# Patient Record
Sex: MALE | Race: BLACK OR AFRICAN AMERICAN | Employment: OTHER | URBAN - METROPOLITAN AREA
[De-identification: names, ages, dates, MRNs, and addresses within clinical notes are randomized per-mention and may not be internally consistent; named-entity substitution may affect disease eponyms.]

---

## 2021-04-21 ENCOUNTER — OFFICE VISIT (OUTPATIENT)
Dept: PODIATRY | Age: 66
End: 2021-04-21
Payer: MEDICARE

## 2021-04-21 VITALS
OXYGEN SATURATION: 98 % | HEIGHT: 68 IN | SYSTOLIC BLOOD PRESSURE: 140 MMHG | DIASTOLIC BLOOD PRESSURE: 73 MMHG | BODY MASS INDEX: 47.74 KG/M2 | TEMPERATURE: 98 F | HEART RATE: 86 BPM | WEIGHT: 315 LBS

## 2021-04-21 DIAGNOSIS — E11.42 TYPE 2 DIABETES MELLITUS WITH DIABETIC POLYNEUROPATHY, WITHOUT LONG-TERM CURRENT USE OF INSULIN (HCC): ICD-10-CM

## 2021-04-21 DIAGNOSIS — S90.221A SUBUNGUAL HEMATOMA OF FOOT, RIGHT, INITIAL ENCOUNTER: Primary | ICD-10-CM

## 2021-04-21 PROCEDURE — 3017F COLORECTAL CA SCREEN DOC REV: CPT | Performed by: PODIATRIST

## 2021-04-21 PROCEDURE — 1101F PT FALLS ASSESS-DOCD LE1/YR: CPT | Performed by: PODIATRIST

## 2021-04-21 PROCEDURE — G8536 NO DOC ELDER MAL SCRN: HCPCS | Performed by: PODIATRIST

## 2021-04-21 PROCEDURE — 99203 OFFICE O/P NEW LOW 30 MIN: CPT | Performed by: PODIATRIST

## 2021-04-21 PROCEDURE — G8427 DOCREV CUR MEDS BY ELIG CLIN: HCPCS | Performed by: PODIATRIST

## 2021-04-21 PROCEDURE — G8510 SCR DEP NEG, NO PLAN REQD: HCPCS | Performed by: PODIATRIST

## 2021-04-21 PROCEDURE — 3046F HEMOGLOBIN A1C LEVEL >9.0%: CPT | Performed by: PODIATRIST

## 2021-04-21 PROCEDURE — 11730 AVULSION NAIL PLATE SIMPLE 1: CPT | Performed by: PODIATRIST

## 2021-04-21 PROCEDURE — 2022F DILAT RTA XM EVC RTNOPTHY: CPT | Performed by: PODIATRIST

## 2021-04-21 PROCEDURE — G8417 CALC BMI ABV UP PARAM F/U: HCPCS | Performed by: PODIATRIST

## 2021-04-21 RX ORDER — SPIRONOLACTONE 25 MG/1
25 TABLET ORAL DAILY
COMMUNITY

## 2021-04-21 RX ORDER — GABAPENTIN 300 MG/1
300 CAPSULE ORAL 3 TIMES DAILY
COMMUNITY

## 2021-04-21 RX ORDER — ISOSORBIDE MONONITRATE 30 MG/1
30 TABLET, EXTENDED RELEASE ORAL DAILY
COMMUNITY

## 2021-04-21 RX ORDER — ATORVASTATIN CALCIUM 10 MG/1
10 TABLET, FILM COATED ORAL DAILY
COMMUNITY

## 2021-04-21 RX ORDER — CHLORTHALIDONE 25 MG/1
25 TABLET ORAL DAILY
COMMUNITY

## 2021-04-21 RX ORDER — METFORMIN HYDROCHLORIDE 500 MG/1
500 TABLET ORAL 2 TIMES DAILY WITH MEALS
COMMUNITY

## 2021-04-21 RX ORDER — HYDRALAZINE HYDROCHLORIDE 50 MG/1
50 TABLET, FILM COATED ORAL 3 TIMES DAILY
COMMUNITY

## 2021-04-21 RX ORDER — FUROSEMIDE 20 MG/1
20 TABLET ORAL DAILY
COMMUNITY

## 2021-04-21 RX ORDER — PHENOL/SODIUM PHENOLATE
20 AEROSOL, SPRAY (ML) MUCOUS MEMBRANE DAILY
COMMUNITY

## 2021-04-21 RX ORDER — METOPROLOL TARTRATE 50 MG/1
50 TABLET ORAL 2 TIMES DAILY
COMMUNITY

## 2021-04-21 RX ORDER — LISINOPRIL 20 MG/1
20 TABLET ORAL DAILY
COMMUNITY

## 2021-04-21 RX ORDER — ALLOPURINOL 100 MG/1
100 TABLET ORAL DAILY
COMMUNITY

## 2021-04-28 PROBLEM — E11.42 TYPE 2 DIABETES MELLITUS WITH DIABETIC POLYNEUROPATHY, WITHOUT LONG-TERM CURRENT USE OF INSULIN (HCC): Status: ACTIVE | Noted: 2021-04-28

## 2021-04-28 PROBLEM — S90.221A SUBUNGUAL HEMATOMA OF FOOT, RIGHT, INITIAL ENCOUNTER: Status: ACTIVE | Noted: 2021-04-28

## 2021-04-28 NOTE — PROGRESS NOTES
[facility]    Subjective:         Patient is a 72 y.o. male who is being seen as a new pt for trauma to his right big toe. Patient states a few weeks prior he scraped the toe against his bed and noticed some bleeding. He states the toenail became dark and appears to have lifted from the nail bed. He denies any associated pain due to his diabetic peripheral neuropathy. Patient knows he is at higher risk for infection due to his diabetes. He denies any local/systemic signs of infection. He denies any other pedal complaints    No past medical history on file. No past surgical history on file. No family history on file. Social History     Tobacco Use    Smoking status: Former Smoker    Smokeless tobacco: Never Used   Substance Use Topics    Alcohol use: Not on file     No Known Allergies  Prior to Admission medications    Medication Sig Start Date End Date Taking? Authorizing Provider   albuterol sulfate (PROAIR RESPICLICK) 90 mcg/actuation breath activated inhaler Take 2 Puffs by inhalation. Yes Provider, Historical   allopurinoL (ZYLOPRIM) 100 mg tablet Take 100 mg by mouth daily. Yes Provider, Historical   atorvastatin (LIPITOR) 10 mg tablet Take 10 mg by mouth daily. Yes Provider, Historical   chlorthalidone (HYGROTON) 25 mg tablet Take 25 mg by mouth daily. Yes Provider, Historical   furosemide (LASIX) 20 mg tablet Take 20 mg by mouth daily. Yes Provider, Historical   gabapentin (NEURONTIN) 300 mg capsule Take 300 mg by mouth three (3) times daily. Yes Provider, Historical   hydrALAZINE (APRESOLINE) 50 mg tablet Take 50 mg by mouth three (3) times daily. Yes Provider, Historical   isosorbide mononitrate ER (IMDUR) 30 mg tablet Take 30 mg by mouth daily. Yes Provider, Historical   lisinopriL (PRINIVIL, ZESTRIL) 20 mg tablet Take 20 mg by mouth daily.    Yes Provider, Historical   metFORMIN (GLUCOPHAGE) 500 mg tablet Take 500 mg by mouth two (2) times daily (with meals). Yes Provider, Historical   metoprolol tartrate (LOPRESSOR) 50 mg tablet Take 50 mg by mouth two (2) times a day. Yes Provider, Historical   Omeprazole delayed release (PRILOSEC D/R) 20 mg tablet Take 20 mg by mouth daily. Yes Provider, Historical   spironolactone (ALDACTONE) 25 mg tablet Take 25 mg by mouth daily. Yes Provider, Historical       Review of Systems   Constitutional: Negative. HENT: Negative. Eyes: Negative. Respiratory: Negative. Cardiovascular: Positive for leg swelling. Gastrointestinal: Negative. Endocrine: Negative. Genitourinary: Negative. Musculoskeletal: Positive for arthralgias. Skin: Negative. Allergic/Immunologic: Positive for immunocompromised state. Neurological: Positive for numbness. Hematological: Negative. Psychiatric/Behavioral: Negative. All other systems reviewed and are negative. Objective:     Visit Vitals  BP (!) 140/73 (BP 1 Location: Right lower arm, BP Patient Position: Sitting, BP Cuff Size: Large adult)   Pulse 86   Temp 98 °F (36.7 °C) (Temporal)   Ht 5' 8\" (1.727 m)   Wt 320 lb 3.2 oz (145.2 kg)   SpO2 98%   BMI 48.69 kg/m²       Physical Exam  Vitals signs reviewed. Constitutional:       Appearance: He is morbidly obese. Cardiovascular:      Pulses:           Dorsalis pedis pulses are 2+ on the right side and 2+ on the left side. Posterior tibial pulses are 2+ on the right side and 2+ on the left side. Pulmonary:      Effort: Pulmonary effort is normal.   Musculoskeletal:      Right lower leg: Edema present. Left lower leg: Edema present. Right foot: Normal range of motion. No deformity or bunion. Left foot: Normal range of motion. No deformity or bunion. Feet:      Right foot:      Protective Sensation: 10 sites tested. 0 sites sensed. Skin integrity: Skin integrity normal.      Left foot:      Protective Sensation: 10 sites tested. 0 sites sensed.       Skin integrity: Skin integrity normal.      Toenail Condition: Left toenails are normal.      Comments: Subungual hematoma to right hallux  Lymphadenopathy:      Lower Body: No right inguinal adenopathy. No left inguinal adenopathy. Skin:     General: Skin is warm. Capillary Refill: Capillary refill takes 2 to 3 seconds. Neurological:      Mental Status: He is alert and oriented to person, place, and time. Psychiatric:         Mood and Affect: Mood and affect normal.         Behavior: Behavior is cooperative. Data Review: No results found for this or any previous visit (from the past 24 hour(s)). Impression:       ICD-10-CM ICD-9-CM    1. Subungual hematoma of foot, right, initial encounter  S90.221A 924.20    2. Type 2 diabetes mellitus with diabetic polyneuropathy, without long-term current use of insulin ([facility])  E11.42 250.60      357.2      Recommendation:     Patient seen and evaluated in the office  Discussed and educated patient regarding their current medical condition. Instructed patient to monitor their feet daily, be compliant with all medications and wear supportive shoe gear. The right hallux was scrubbed and draped in sterile fashion and the nail was removed in its entirety. No anesthesia was needed. The nailbed was inspected for laceration with none found. Appropriate wound care was performed.   Instructed patient to soak his foot in Epsom salt and warm water for 15 minutes daily for the next 7 to 10 days  He is to monitor and call the office immediately if his condition changes

## 2022-03-19 PROBLEM — S90.221A SUBUNGUAL HEMATOMA OF FOOT, RIGHT, INITIAL ENCOUNTER: Status: ACTIVE | Noted: 2021-04-28

## 2022-03-20 PROBLEM — E11.42 TYPE 2 DIABETES MELLITUS WITH DIABETIC POLYNEUROPATHY, WITHOUT LONG-TERM CURRENT USE OF INSULIN (HCC): Status: ACTIVE | Noted: 2021-04-28

## 2022-05-03 ENCOUNTER — HOSPITAL ENCOUNTER (EMERGENCY)
Age: 67
Discharge: HOME | End: 2022-05-03
Attending: EMERGENCY MEDICINE
Payer: MEDICARE

## 2022-05-03 VITALS
RESPIRATION RATE: 18 BRPM | DIASTOLIC BLOOD PRESSURE: 88 MMHG | HEART RATE: 94 BPM | WEIGHT: 315 LBS | HEIGHT: 68 IN | BODY MASS INDEX: 47.74 KG/M2 | OXYGEN SATURATION: 99 % | SYSTOLIC BLOOD PRESSURE: 169 MMHG | TEMPERATURE: 98.7 F

## 2022-05-03 DIAGNOSIS — M17.12 ARTHRITIS OF LEFT KNEE: Primary | ICD-10-CM

## 2022-05-03 DIAGNOSIS — M17.11 ARTHRITIS OF RIGHT KNEE: ICD-10-CM

## 2022-05-03 PROCEDURE — 74011250637 HC RX REV CODE- 250/637: Performed by: EMERGENCY MEDICINE

## 2022-05-03 PROCEDURE — 99283 EMERGENCY DEPT VISIT LOW MDM: CPT

## 2022-05-03 RX ORDER — HYDROCODONE BITARTRATE AND ACETAMINOPHEN 5; 325 MG/1; MG/1
1 TABLET ORAL
Qty: 10 TABLET | Refills: 0 | Status: SHIPPED | OUTPATIENT
Start: 2022-05-03 | End: 2022-05-06

## 2022-05-03 RX ORDER — HYDROCODONE BITARTRATE AND ACETAMINOPHEN 5; 325 MG/1; MG/1
2 TABLET ORAL
Status: COMPLETED | OUTPATIENT
Start: 2022-05-03 | End: 2022-05-03

## 2022-05-03 RX ADMIN — HYDROCODONE BITARTRATE AND ACETAMINOPHEN 2 TABLET: 5; 325 TABLET ORAL at 13:27

## 2022-05-03 NOTE — ED TRIAGE NOTES
Patient reports that for 3 days he has had pain in his left knee but now he is having pain in both knees that began this morning. Reports chronic knee pain but this is worse. Reports that he has been riding a stationary bike which may have made the pain worse. Ambulated to exam room with steady gait, no assistance needed.  Also has hx of gout

## 2022-05-03 NOTE — ED PROVIDER NOTES
71yo m w hx of predm, htn, cri presenting to ed w hiwot knee pain over the past 3-4 days, noted after riding stationary bike the day prior. States rides fairly regularly but after riding 5 days ago noted pain. Described as dull, \"tooth achy\" pain. L>>R. With mild swelling of the left knee. No redness. No fevers, nausea vomiting. No other known trauma, injury or overuse. No past medical history on file. No past surgical history on file. No family history on file. Social History     Socioeconomic History    Marital status: SINGLE     Spouse name: Not on file    Number of children: Not on file    Years of education: Not on file    Highest education level: Not on file   Occupational History    Not on file   Tobacco Use    Smoking status: Former Smoker    Smokeless tobacco: Never Used   Substance and Sexual Activity    Alcohol use: Not on file    Drug use: Not on file    Sexual activity: Not on file   Other Topics Concern    Not on file   Social History Narrative    Not on file     Social Determinants of Health     Financial Resource Strain:     Difficulty of Paying Living Expenses: Not on file   Food Insecurity:     Worried About Running Out of Food in the Last Year: Not on file    [place] of Food in the Last Year: Not on file   Transportation Needs:     Lack of Transportation (Medical): Not on file    Lack of Transportation (Non-Medical):  Not on file   Physical Activity:     Days of Exercise per Week: Not on file    Minutes of Exercise per Session: Not on file   Stress:     Feeling of Stress : Not on file   Social Connections:     Frequency of Communication with Friends and Family: Not on file    Frequency of Social Gatherings with Friends and Family: Not on file    Attends Druze Services: Not on file    Active Member of Clubs or Organizations: Not on file    Attends Club or Organization Meetings: Not on file    Marital Status: Not on file   Intimate Partner Violence:     Fear of Current or Ex-Partner: Not on file    Emotionally Abused: Not on file    Physically Abused: Not on file    Sexually Abused: Not on file   Housing Stability:     Unable to Pay for Housing in the Last Year: Not on file    Number of Places Lived in the Last Year: Not on file    Unstable Housing in the Last Year: Not on file         ALLERGIES: Patient has no known allergies. Review of Systems   Constitutional: Negative for appetite change, fatigue and fever. HENT: Negative for congestion, ear pain, sinus pressure, sinus pain and sore throat. Eyes: Negative for redness and visual disturbance. Respiratory: Negative for cough, chest tightness and shortness of breath. Cardiovascular: Negative for chest pain, palpitations and leg swelling. Gastrointestinal: Negative for abdominal pain, diarrhea, nausea and vomiting. Genitourinary: Negative. Musculoskeletal: Positive for arthralgias, back pain (chronic) and joint swelling. Negative for myalgias. Skin: Negative for rash. Neurological: Negative for dizziness, speech difficulty, light-headedness, numbness and headaches. Psychiatric/Behavioral: Negative for suicidal ideas. The patient is not nervous/anxious. Vitals:    05/03/22 1302   BP: (!) 169/88   Pulse: 94   Resp: 18   Temp: 98.7 °F (37.1 °C)   SpO2: 99%   Weight: 145.2 kg (320 lb)   Height: 5' 8\" (1.727 m)            Physical Exam  Vitals and nursing note reviewed. Constitutional:       Appearance: Normal appearance. HENT:      Head: Normocephalic. Right Ear: External ear normal.      Left Ear: External ear normal.      Nose: Nose normal.      Mouth/Throat:      Mouth: Mucous membranes are moist.      Pharynx: Oropharynx is clear. Eyes:      Pupils: Pupils are equal, round, and reactive to light. Cardiovascular:      Rate and Rhythm: Normal rate and regular rhythm. Pulses: Normal pulses.    Abdominal:      Comments: Protuberant Musculoskeletal:         General: Normal range of motion. Cervical back: Normal range of motion. Comments: TTP ant joint lines both knees. Neurological:      Mental Status: He is alert. MDM  Number of Diagnoses or Management Options  Diagnosis management comments: 71yo male presenting w hiwot knee pain, likely associated with arthritis and stationary bike use. Clinically no signs/sxs of infection, or crystal induced arthropathy. Discussed imaging with adi. States he can talk with his orthopedist about imaging if symtpoms not improved with medication. Has been taking tylenol and gabapentin at [facility] without much relief and notes having CRI and unable to take NSAIDs will give small # pain medications.  Follow up with orthopedics          Procedures

## 2022-06-09 ENCOUNTER — TRANSCRIBE ORDER (OUTPATIENT)
Dept: SCHEDULING | Age: 67
End: 2022-06-09

## 2022-06-09 DIAGNOSIS — N18.30 CHRONIC KIDNEY DISEASE, STAGE 3 UNSPECIFIED (HCC): Primary | ICD-10-CM

## 2022-06-11 VITALS
HEART RATE: 89 BPM | TEMPERATURE: 98.8 F | OXYGEN SATURATION: 99 % | WEIGHT: 315 LBS | DIASTOLIC BLOOD PRESSURE: 77 MMHG | SYSTOLIC BLOOD PRESSURE: 162 MMHG | HEIGHT: 68 IN | RESPIRATION RATE: 20 BRPM | BODY MASS INDEX: 47.74 KG/M2

## 2022-06-11 PROCEDURE — 99283 EMERGENCY DEPT VISIT LOW MDM: CPT

## 2022-06-11 PROCEDURE — 75810000289 HC I&D ABSCESS SIMP/COMP/MULT

## 2022-06-12 ENCOUNTER — HOSPITAL ENCOUNTER (EMERGENCY)
Age: 67
Discharge: HOME | End: 2022-06-12
Attending: EMERGENCY MEDICINE
Payer: MEDICARE

## 2022-06-12 DIAGNOSIS — L02.91 ABSCESS: Primary | ICD-10-CM

## 2022-06-12 PROCEDURE — 74011000250 HC RX REV CODE- 250: Performed by: EMERGENCY MEDICINE

## 2022-06-12 PROCEDURE — 74011250637 HC RX REV CODE- 250/637: Performed by: EMERGENCY MEDICINE

## 2022-06-12 PROCEDURE — 75810000289 HC I&D ABSCESS SIMP/COMP/MULT

## 2022-06-12 RX ORDER — LIDOCAINE HYDROCHLORIDE 10 MG/ML
10 INJECTION INFILTRATION; PERINEURAL
Status: COMPLETED | OUTPATIENT
Start: 2022-06-12 | End: 2022-06-12

## 2022-06-12 RX ORDER — CLINDAMYCIN HYDROCHLORIDE 150 MG/1
300 CAPSULE ORAL
Status: COMPLETED | OUTPATIENT
Start: 2022-06-12 | End: 2022-06-12

## 2022-06-12 RX ORDER — CLINDAMYCIN HYDROCHLORIDE 300 MG/1
300 CAPSULE ORAL 3 TIMES DAILY
Qty: 30 CAPSULE | Refills: 0 | Status: SHIPPED | OUTPATIENT
Start: 2022-06-12 | End: 2022-06-23

## 2022-06-12 RX ADMIN — LIDOCAINE HYDROCHLORIDE 10 ML: 10 INJECTION, SOLUTION INFILTRATION; PERINEURAL at 01:34

## 2022-06-12 RX ADMIN — CLINDAMYCIN HYDROCHLORIDE 300 MG: 150 CAPSULE ORAL at 02:19

## 2022-06-12 NOTE — ED PROVIDER NOTES
EMERGENCY DEPARTMENT HISTORY AND PHYSICAL EXAM  ?    Date: 6/12/2022  Patient Name: Rolf Garcia    History of Presenting Illness    Patient presents with:  Skin Problem      History Provided By: Patient    HPI: Rolf Garcia, 77 y.o. male with no significant past medical history presents to the ED with cc of painful swelling of the submental space that was noted this morning. Severe. He has no difficulty swallowing. Fever is not reported. There are no other complaints, changes, or physical findings at this time. PCP: Yudy Amos MD    No current facility-administered medications on file prior to encounter. Current Outpatient Medications on File Prior to Encounter:  albuterol sulfate (PROAIR RESPICLICK) 90 mcg/actuation breath activated inhaler, Take 2 Puffs by inhalation. , Disp: , Rfl:   allopurinoL (ZYLOPRIM) 100 mg tablet, Take 100 mg by mouth daily. , Disp: , Rfl:   atorvastatin (LIPITOR) 10 mg tablet, Take 10 mg by mouth daily. , Disp: , Rfl:   chlorthalidone (HYGROTON) 25 mg tablet, Take 25 mg by mouth daily. , Disp: , Rfl:   furosemide (LASIX) 20 mg tablet, Take 20 mg by mouth daily. , Disp: , Rfl:   gabapentin (NEURONTIN) 300 mg capsule, Take 300 mg by mouth three (3) times daily. , Disp: , Rfl:   hydrALAZINE (APRESOLINE) 50 mg tablet, Take 50 mg by mouth three (3) times daily. , Disp: , Rfl:   isosorbide mononitrate ER (IMDUR) 30 mg tablet, Take 30 mg by mouth daily. , Disp: , Rfl:   lisinopriL (PRINIVIL, ZESTRIL) 20 mg tablet, Take 20 mg by mouth daily. , Disp: , Rfl:   metFORMIN (GLUCOPHAGE) 500 mg tablet, Take 500 mg by mouth two (2) times daily (with meals). , Disp: , Rfl:   metoprolol tartrate (LOPRESSOR) 50 mg tablet, Take 50 mg by mouth two (2) times a day., Disp: , Rfl:   Omeprazole delayed release (PRILOSEC D/R) 20 mg tablet, Take 20 mg by mouth daily. , Disp: , Rfl:   spironolactone (ALDACTONE) 25 mg tablet, Take 25 mg by mouth daily. , Disp: , Rfl:         Past History    Past Medical History:  No past medical history on file. Past Surgical History:  No past surgical history on file. Family History:  No family history on file. Social History:  Social History   Tobacco Use     Smoking status: Former Smoker     Smokeless tobacco: Never Used   Alcohol use: Not on file   Drug use: Not on file      Allergies:  No Known Allergies      Review of Systems  @[facility]@    Physical Exam  [unfilled]    Diagnostic Study Results    Labs -  No results found for this or any previous visit (from the past 12 hour(s)). Radiologic Studies -   No orders to display  CT Results  (Last 48 hours)   None     CXR Results  (Last 48 hours)   None         Medical Decision Making  I am the first provider for this patient. I reviewed the vital signs, available nursing notes, past medical history, past surgical history, family history and social history. Vital Signs-Reviewed the patient's vital signs. Empty flowsheet group. Records Reviewed: Nursing Notes    Provider Notes (Medical Decision Making):   Bedside ultrasound showed fluid of 1 cm collection. Will I&D. ED Course:     Initial assessment performed. The patients presenting problems have been discussed, and they are in agreement with the care plan formulated and outlined with them. I have encouraged them to ask questions as they arise throughout their visit. PLAN:  1. Current Discharge Medication List      2. Follow-up Information    None    Return to ED if worse     Diagnosis    Clinical Impression: Sebaceous cyst abscess  ? No past medical history on file. No past surgical history on file. No family history on file.     Social History     Socioeconomic History    Marital status: SINGLE     Spouse name: Not on file    Number of children: Not on file    Years of education: Not on file    Highest education level: Not on file   Occupational History    Not on file   Tobacco Use    Smoking status: Former Smoker    Smokeless tobacco: Never Used   Substance and Sexual Activity    Alcohol use: Not on file    Drug use: Not on file    Sexual activity: Not on file   Other Topics Concern    Not on file   Social History Narrative    Not on file     Social Determinants of Health     Financial Resource Strain:     Difficulty of Paying Living Expenses: Not on file   Food Insecurity:     Worried About Running Out of Food in the Last Year: Not on file    [place] of Food in the Last Year: Not on file   Transportation Needs:     Lack of Transportation (Medical): Not on file    Lack of Transportation (Non-Medical): Not on file   Physical Activity:     Days of Exercise per Week: Not on file    Minutes of Exercise per Session: Not on file   Stress:     Feeling of Stress : Not on file   Social Connections:     Frequency of Communication with Friends and Family: Not on file    Frequency of Social Gatherings with Friends and Family: Not on file    Attends Adventism Services: Not on file    Active Member of [address] [place] [address] or Organizations: Not on file    Attends Club or Organization Meetings: Not on file    Marital Status: Not on file   Intimate Partner Violence:     Fear of Current or Ex-Partner: Not on file    Emotionally Abused: Not on file    Physically Abused: Not on file    Sexually Abused: Not on file   Housing Stability:     Unable to Pay for Housing in the Last Year: Not on file    Number of [place] in the Last Year: Not on file    Unstable Housing in the Last Year: Not on file         ALLERGIES: Patient has no known allergies. Review of Systems   Constitutional: Negative. HENT: Positive for facial swelling. Negative for dental problem, sore throat and trouble swallowing. Eyes: Negative. Respiratory: Negative. Skin: Positive for wound. Chin swelling   Hematological: Negative.         Vitals:    06/11/22 2115   BP: (!) 162/77   Pulse: 89   Resp: 20   Temp: 98.8 °F (37.1 °C)   SpO2: 99% Weight: 143.7 kg (316 lb 11.2 oz)   Height: 5' 8\" (1.727 m)            Physical Exam  Vitals and nursing note reviewed. Constitutional:       Appearance: Normal appearance. HENT:      Head: Normocephalic and atraumatic. Mouth/Throat:      Mouth: Mucous membranes are moist.      Pharynx: Oropharynx is clear. Comments: Indurated subcu mental abscess  Eyes:      Conjunctiva/sclera: Conjunctivae normal.      Pupils: Pupils are equal, round, and reactive to light. Musculoskeletal:      Cervical back: Normal range of motion. Neurological:      Mental Status: He is alert. MDM  Number of Diagnoses or Management Options         I&D Abcess Simple    Date/Time: 6/12/2022 7:11 AM  Performed by: Masood Resendez MD  Authorized by: Masood Resendez MD     Consent:     Consent obtained:  Verbal    Consent given by:  Patient    Risks discussed:  Incomplete drainage, pain and infection  Location:     Type:  Abscess    Location:  Head  Pre-procedure details:     Skin preparation:  Betadine  Anesthesia (see MAR for exact dosages): Anesthesia method:  Local infiltration    Local anesthetic:  Lidocaine 1% w/o epi  Procedure type:     Complexity:  Simple  Procedure details:     Needle aspiration: no      Incision types:  Stab incision    Incision depth:  Dermal    Scalpel blade:  11    Wound management:  Probed and deloculated    Drainage:  Purulent    Drainage amount: Moderate    Wound treatment:  Drain placed    Packing materials:  1/4 in gauze  Post-procedure details:     Patient tolerance of procedure:   Tolerated well, no immediate complications

## 2022-06-22 ENCOUNTER — HOSPITAL ENCOUNTER (OUTPATIENT)
Dept: ULTRASOUND IMAGING | Age: 67
Discharge: HOME | End: 2022-06-22
Attending: INTERNAL MEDICINE
Payer: MEDICARE

## 2022-06-22 DIAGNOSIS — N18.30 CHRONIC KIDNEY DISEASE, STAGE 3 UNSPECIFIED (HCC): ICD-10-CM

## 2022-06-22 PROCEDURE — 76775 US EXAM ABDO BACK WALL LIM: CPT

## 2022-06-23 ENCOUNTER — OFFICE VISIT (OUTPATIENT)
Dept: SURGERY | Age: 67
End: 2022-06-23
Payer: MEDICARE

## 2022-06-23 VITALS
OXYGEN SATURATION: 98 % | HEIGHT: 68 IN | HEART RATE: 67 BPM | BODY MASS INDEX: 47.26 KG/M2 | TEMPERATURE: 97.6 F | RESPIRATION RATE: 18 BRPM | SYSTOLIC BLOOD PRESSURE: 110 MMHG | DIASTOLIC BLOOD PRESSURE: 80 MMHG | WEIGHT: 311.8 LBS

## 2022-06-23 DIAGNOSIS — L72.0 INFECTED EPIDERMOID CYST: Primary | ICD-10-CM

## 2022-06-23 DIAGNOSIS — L08.9 INFECTED EPIDERMOID CYST: Primary | ICD-10-CM

## 2022-06-23 PROCEDURE — G8510 SCR DEP NEG, NO PLAN REQD: HCPCS | Performed by: COLON & RECTAL SURGERY

## 2022-06-23 PROCEDURE — G8427 DOCREV CUR MEDS BY ELIG CLIN: HCPCS | Performed by: COLON & RECTAL SURGERY

## 2022-06-23 PROCEDURE — 1123F ACP DISCUSS/DSCN MKR DOCD: CPT | Performed by: COLON & RECTAL SURGERY

## 2022-06-23 PROCEDURE — 1101F PT FALLS ASSESS-DOCD LE1/YR: CPT | Performed by: COLON & RECTAL SURGERY

## 2022-06-23 PROCEDURE — G8417 CALC BMI ABV UP PARAM F/U: HCPCS | Performed by: COLON & RECTAL SURGERY

## 2022-06-23 PROCEDURE — 99203 OFFICE O/P NEW LOW 30 MIN: CPT | Performed by: COLON & RECTAL SURGERY

## 2022-06-23 PROCEDURE — 3017F COLORECTAL CA SCREEN DOC REV: CPT | Performed by: COLON & RECTAL SURGERY

## 2022-06-23 PROCEDURE — G8536 NO DOC ELDER MAL SCRN: HCPCS | Performed by: COLON & RECTAL SURGERY

## 2022-06-23 RX ORDER — BUDESONIDE AND FORMOTEROL FUMARATE DIHYDRATE 160; 4.5 UG/1; UG/1
2 AEROSOL RESPIRATORY (INHALATION) 2 TIMES DAILY
COMMUNITY
Start: 2022-06-05

## 2022-07-18 ENCOUNTER — OFFICE VISIT (OUTPATIENT)
Dept: SURGERY | Age: 67
End: 2022-07-18
Payer: MEDICARE

## 2022-07-18 VITALS
HEIGHT: 68 IN | SYSTOLIC BLOOD PRESSURE: 120 MMHG | HEART RATE: 66 BPM | RESPIRATION RATE: 18 BRPM | DIASTOLIC BLOOD PRESSURE: 71 MMHG | BODY MASS INDEX: 47.38 KG/M2 | OXYGEN SATURATION: 97 % | TEMPERATURE: 98 F | WEIGHT: 312.6 LBS

## 2022-07-18 DIAGNOSIS — L08.9 INFECTED EPIDERMOID CYST: Primary | ICD-10-CM

## 2022-07-18 DIAGNOSIS — L72.0 INFECTED EPIDERMOID CYST: Primary | ICD-10-CM

## 2022-07-18 PROCEDURE — G8536 NO DOC ELDER MAL SCRN: HCPCS | Performed by: COLON & RECTAL SURGERY

## 2022-07-18 PROCEDURE — G8427 DOCREV CUR MEDS BY ELIG CLIN: HCPCS | Performed by: COLON & RECTAL SURGERY

## 2022-07-18 PROCEDURE — 3017F COLORECTAL CA SCREEN DOC REV: CPT | Performed by: COLON & RECTAL SURGERY

## 2022-07-18 PROCEDURE — G8510 SCR DEP NEG, NO PLAN REQD: HCPCS | Performed by: COLON & RECTAL SURGERY

## 2022-07-18 PROCEDURE — G8417 CALC BMI ABV UP PARAM F/U: HCPCS | Performed by: COLON & RECTAL SURGERY

## 2022-07-18 PROCEDURE — 1123F ACP DISCUSS/DSCN MKR DOCD: CPT | Performed by: COLON & RECTAL SURGERY

## 2022-07-18 PROCEDURE — 99213 OFFICE O/P EST LOW 20 MIN: CPT | Performed by: COLON & RECTAL SURGERY

## 2022-07-18 PROCEDURE — 1101F PT FALLS ASSESS-DOCD LE1/YR: CPT | Performed by: COLON & RECTAL SURGERY

## 2022-07-18 NOTE — LETTER
7/18/2022    Patient: Maddie Campos   YOB: 1955   Date of Visit: 7/18/2022     Atul Yoon MD  [address] [id number]  Via Fax: [phone]    Dear Atul Yoon MD,      Thank you for referring Mr. Leobardo Nur to [address] [place] [address] for evaluation. My notes for this consultation are attached. If you have questions, please do not hesitate to call me. I look forward to following your patient along with you.       Sincerely,    Candance Bushy, MD

## 2022-07-18 NOTE — PROGRESS NOTES
Chief Complaint   Patient presents with    Follow-up     abscess     Visit Vitals  /71 (BP 1 Location: Left arm, BP Patient Position: Sitting)   Pulse 66   Temp 98 °F (36.7 °C) (Temporal)   Resp 18   Ht 5' 8\" (1.727 m)   Wt 312 lb 9.6 oz (141.8 kg)   SpO2 97%   BMI 47.53 kg/m²     1. Have you been to the ER, urgent care clinic since your last visit? Hospitalized since your last visit? No    2. Have you seen or consulted any other health care providers outside of the [address] [place] [address] since your last visit? Include any pap smears or colon screening.  No

## 2022-07-18 NOTE — PROGRESS NOTES
OFFICE VISIT NOTE    Shannan Do is a 77 y.o. male who presents to the office today for:    Chief Complaint   Patient presents with    Follow-up     abscess       MrFransico Carpio comes in today for follow-up of his chin abscess. I saw him approximately 3 weeks ago and at that time he had a infected sebaceous cyst that spontaneously draining. I was able to express a fair amount of sebum. He has completed a course of antibiotics. Today states the area is completely healed. He denies any ongoing drainage or pain. He denies any swelling. Past Medical History:   Diagnosis Date    Arthritis     Diabetes ([address]     Gout     Hypertension        History reviewed. No pertinent surgical history. Family History   Family history unknown: Yes       Social History     Socioeconomic History    Marital status: SINGLE     Spouse name: Not on file    Number of children: Not on file    Years of education: Not on file    Highest education level: Not on file   Occupational History    Not on file   Tobacco Use    Smoking status: Former Smoker     Packs/day: 1.00     Years: 30.00     Pack years: 30.00     Quit date:      Years since quittin.5    Smokeless tobacco: Never Used   Vaping Use    Vaping Use: Never used   Substance and Sexual Activity    Alcohol use: Never    Drug use: Never    Sexual activity: Not on file   Other Topics Concern    Not on file   Social History Narrative    Not on file     Social Determinants of Health     Financial Resource Strain:     Difficulty of Paying Living Expenses: Not on file   Food Insecurity:     Worried About [address] in the Last Year: Not on file    [address] in the Last Year: Not on file   Transportation Needs:     Lack of Transportation (Medical): Not on file    Lack of Transportation (Non-Medical):  Not on file   Physical Activity:     Days of Exercise per Week: Not on file    Minutes of Exercise per Session: Not on file   Stress:     Feeling of Stress : Not on file   Social Connections:     Frequency of Communication with Friends and Family: Not on file    Frequency of Social Gatherings with Friends and Family: Not on file    Attends Mandaeism Services: Not on file    Active Member of [address] [place] [address] or Organizations: Not on file    Attends Club or Organization Meetings: Not on file    Marital Status: Not on file   Intimate Partner Violence:     Fear of Current or Ex-Partner: Not on file    Emotionally Abused: Not on file    Physically Abused: Not on file    Sexually Abused: Not on file   Housing Stability:     Unable to Pay for Housing in the Last Year: Not on file    Number of [place] in the Last Year: Not on file    Unstable Housing in the Last Year: Not on file       No Known Allergies    Current Outpatient Medications   Medication Sig    Symbicort 160-4.5 mcg/actuation HFAA Take 2 Puffs by inhalation two (2) times a day.  albuterol sulfate (PROAIR RESPICLICK) 90 mcg/actuation breath activated inhaler Take 2 Puffs by inhalation.  allopurinoL (ZYLOPRIM) 100 mg tablet Take 100 mg by mouth daily.  atorvastatin (LIPITOR) 10 mg tablet Take 10 mg by mouth daily.  chlorthalidone (HYGROTON) 25 mg tablet Take 25 mg by mouth daily.  furosemide (LASIX) 20 mg tablet Take 20 mg by mouth daily.  gabapentin (NEURONTIN) 300 mg capsule Take 300 mg by mouth three (3) times daily.  hydrALAZINE (APRESOLINE) 50 mg tablet Take 50 mg by mouth three (3) times daily.  isosorbide mononitrate ER (IMDUR) 30 mg tablet Take 30 mg by mouth daily.  lisinopriL (PRINIVIL, ZESTRIL) 20 mg tablet Take 20 mg by mouth daily.  metFORMIN (GLUCOPHAGE) 500 mg tablet Take 500 mg by mouth two (2) times daily (with meals).  metoprolol tartrate (LOPRESSOR) 50 mg tablet Take 50 mg by mouth two (2) times a day.     Omeprazole delayed release (PRILOSEC D/R) 20 mg tablet Take 20 mg by mouth daily.  spironolactone (ALDACTONE) 25 mg tablet Take 25 mg by mouth daily. No current facility-administered medications for this visit. Review of Systems   All other systems reviewed and are negative. /71 (BP 1 Location: Left arm, BP Patient Position: Sitting)   Pulse 66   Temp 98 °F (36.7 °C) (Temporal)   Resp 18   Ht 5' 8\" (1.727 m)   Wt 141.8 kg (312 lb 9.6 oz)   SpO2 97%   BMI 47.53 kg/m²     Physical Exam  HENT:      Head:        Comments: Small residual epidermoid cyst at site of prior abscess submental.  Area soft none tender nonerythematous and nonindurated        Problem List Items Addressed This Visit        Integumentary    Infected epidermoid cyst - Primary          Assessment and Plan:  Told Mr. Candance Hint that since the area is resolved that at this point there is no absolute need for elective excision. He does have a slightly higher chance of developing recurrent abscess. Should he develop recurrent abscess to call my office or go to the emergency department.             Jovany Michel MD

## 2022-12-02 ENCOUNTER — HOSPITAL ENCOUNTER (EMERGENCY)
Age: 67
Discharge: HOME | End: 2022-12-02
Attending: EMERGENCY MEDICINE
Payer: MEDICARE

## 2022-12-02 VITALS
TEMPERATURE: 97.9 F | WEIGHT: 302 LBS | RESPIRATION RATE: 18 BRPM | SYSTOLIC BLOOD PRESSURE: 138 MMHG | HEIGHT: 68 IN | OXYGEN SATURATION: 100 % | HEART RATE: 63 BPM | BODY MASS INDEX: 45.77 KG/M2 | DIASTOLIC BLOOD PRESSURE: 78 MMHG

## 2022-12-02 DIAGNOSIS — M48.062 SPINAL STENOSIS OF LUMBAR REGION WITH NEUROGENIC CLAUDICATION: Primary | ICD-10-CM

## 2022-12-02 PROCEDURE — 99283 EMERGENCY DEPT VISIT LOW MDM: CPT | Performed by: EMERGENCY MEDICINE

## 2022-12-02 PROCEDURE — A9270 NON-COVERED ITEM OR SERVICE: HCPCS | Performed by: EMERGENCY MEDICINE

## 2022-12-02 PROCEDURE — 74011636637 HC RX REV CODE- 636/637: Performed by: EMERGENCY MEDICINE

## 2022-12-02 RX ORDER — PREDNISONE 20 MG/1
40 TABLET ORAL
Status: COMPLETED | OUTPATIENT
Start: 2022-12-02 | End: 2022-12-02

## 2022-12-02 RX ORDER — PREDNISONE 20 MG/1
TABLET ORAL
Qty: 9 TABLET | Refills: 0 | Status: SHIPPED | OUTPATIENT
Start: 2022-12-02 | End: 2022-12-09

## 2022-12-02 RX ORDER — TRAMADOL HYDROCHLORIDE 50 MG/1
50 TABLET ORAL
Qty: 18 TABLET | Refills: 0 | Status: SHIPPED | OUTPATIENT
Start: 2022-12-02 | End: 2022-12-05

## 2022-12-02 RX ADMIN — PREDNISONE 40 MG: 20 TABLET ORAL at 08:53

## 2022-12-02 NOTE — ED TRIAGE NOTES
Patient reports back pain that radiates down left leg x1 week.  No PCP appt and has had problems with same in the past patient denies injury

## 2022-12-02 NOTE — ED PROVIDER NOTES
HPI 71-year-old male medical problems listed below long history of chronic back pain due to lumbar spinal stenosis is followed with orthopedics at [facility] presents the ED now with 1 week of worsening back pain originating in the low lumbar back radiating to the left lateral anterior thigh and proximal anterior lower leg. Pain does not extend to the foot patient is ambulatory. Pain is worse with spinal flexion no bowel or bladder incontinence. No recent injury. See MRI report from [facility] clinic visit 2022 showing stenosis at several levels. Also with kidney disease unable to take NSAIDs    Past Medical History:   Diagnosis Date    Arthritis     Diabetes ([address]     Gout     Hypertension        No past surgical history on file. Family History:   Family history unknown: Yes       Social History     Socioeconomic History    Marital status: SINGLE     Spouse name: Not on file    Number of children: Not on file    Years of education: Not on file    Highest education level: Not on file   Occupational History    Not on file   Tobacco Use    Smoking status: Former     Packs/day: 1.00     Years: 30.00     Pack years: 30.00     Types: Cigarettes     Quit date:      Years since quittin.9    Smokeless tobacco: Never   Vaping Use    Vaping Use: Never used   Substance and Sexual Activity    Alcohol use: Never    Drug use: Never    Sexual activity: Not on file   Other Topics Concern    Not on file   Social History Narrative    Not on file     Social Determinants of Health     Financial Resource Strain: Not on file   Food Insecurity: Not on file   Transportation Needs: Not on file   Physical Activity: Not on file   Stress: Not on file   Social Connections: Not on file   Intimate Partner Violence: Not on file   Housing Stability: Not on file         ALLERGIES: Patient has no known allergies. Review of Systems   Constitutional:  Negative for appetite change, chills, diaphoresis and fever.    HENT:  Negative for ear pain, facial swelling, sinus pain and trouble swallowing. Eyes:  Negative for redness and visual disturbance. Respiratory:  Negative for cough, choking, chest tightness, shortness of breath, wheezing and stridor. Cardiovascular:  Negative for chest pain, palpitations and leg swelling. Gastrointestinal:  Negative for abdominal distention, abdominal pain, blood in stool, diarrhea, nausea and vomiting. Endocrine: Negative for polydipsia and polyuria. Genitourinary:  Negative for difficulty urinating, dysuria, flank pain, frequency, hematuria and urgency. Musculoskeletal:  Positive for back pain. Negative for neck pain. Allergic/Immunologic: Negative. Neurological: Negative. Negative for weakness and numbness. Psychiatric/Behavioral: Negative. Vitals:    12/02/22 0828   BP: 138/78   Pulse: 63   Resp: 18   Temp: 97.9 °F (36.6 °C)   SpO2: 100%   Weight: 137 kg (302 lb)   Height: 5' 8\" (1.727 m)            Physical Exam  Vitals and nursing note reviewed. Constitutional:       General: He is not in acute distress. Appearance: Normal appearance. He is obese. He is not ill-appearing, toxic-appearing or diaphoretic. HENT:      Head: Normocephalic and atraumatic. Nose: Nose normal.      Mouth/Throat:      Mouth: Mucous membranes are moist.   Eyes:      Extraocular Movements: Extraocular movements intact. Conjunctiva/sclera: Conjunctivae normal.   Cardiovascular:      Rate and Rhythm: Normal rate and regular rhythm. Pulses: Normal pulses. Heart sounds: Normal heart sounds. No murmur heard. Pulmonary:      Effort: Pulmonary effort is normal. No respiratory distress. Breath sounds: Normal breath sounds. No wheezing, rhonchi or rales. Abdominal:      General: Bowel sounds are normal. There is no distension. Palpations: Abdomen is soft. There is no mass. Tenderness: There is no abdominal tenderness.  There is no right CVA tenderness, left CVA tenderness, guarding or rebound. Hernia: No hernia is present. Musculoskeletal:         General: No swelling, tenderness, deformity or signs of injury. Normal range of motion. Cervical back: Normal range of motion and neck supple. No rigidity or tenderness. Right lower leg: No edema. Left lower leg: No edema. Lymphadenopathy:      Cervical: No cervical adenopathy. Skin:     General: Skin is warm and dry. Capillary Refill: Capillary refill takes less than 2 seconds. Findings: No erythema, lesion or rash. Neurological:      General: No focal deficit present. Mental Status: He is alert and oriented to person, place, and time. Mental status is at baseline. Cranial Nerves: No cranial nerve deficit. Sensory: No sensory deficit. Motor: No weakness. Comments: Patient's radicular pain in the left leg follows an L4 dermatome there is no discernible motor weakness sensation is intact to light touch distal pulses are 2+   Psychiatric:         Mood and Affect: Mood normal.         Behavior: Behavior normal.         Thought Content: Thought content normal.        MDM  42-year-old male with long history of chronic back pain spinal stenosis now with radiculopathy in the left leg. Patient has deferred surgery and spinal injections in the past so this may be his only treatment modality to provide any relief.   After discussion with patient will give trial of week of prednisone Ultram for pain strongly encouraged follow-up with his orthopedist in the next several days       Procedures

## 2023-05-20 RX ORDER — FUROSEMIDE 20 MG/1
20 TABLET ORAL DAILY
COMMUNITY

## 2023-05-20 RX ORDER — ISOSORBIDE MONONITRATE 30 MG/1
30 TABLET, EXTENDED RELEASE ORAL DAILY
COMMUNITY

## 2023-05-20 RX ORDER — LISINOPRIL 20 MG/1
20 TABLET ORAL DAILY
COMMUNITY

## 2023-05-20 RX ORDER — BUDESONIDE AND FORMOTEROL FUMARATE DIHYDRATE 160; 4.5 UG/1; UG/1
2 AEROSOL RESPIRATORY (INHALATION) 2 TIMES DAILY
COMMUNITY
Start: 2022-06-05

## 2023-05-20 RX ORDER — GABAPENTIN 300 MG/1
300 CAPSULE ORAL 3 TIMES DAILY
COMMUNITY

## 2023-05-20 RX ORDER — SPIRONOLACTONE 25 MG/1
25 TABLET ORAL DAILY
COMMUNITY

## 2023-05-20 RX ORDER — CHLORTHALIDONE 25 MG/1
25 TABLET ORAL DAILY
COMMUNITY

## 2023-05-20 RX ORDER — HYDRALAZINE HYDROCHLORIDE 50 MG/1
50 TABLET, FILM COATED ORAL 3 TIMES DAILY
COMMUNITY

## 2023-05-20 RX ORDER — METOPROLOL TARTRATE 50 MG/1
50 TABLET, FILM COATED ORAL 2 TIMES DAILY
COMMUNITY

## 2023-05-20 RX ORDER — OMEPRAZOLE 20 MG/1
20 TABLET, DELAYED RELEASE ORAL DAILY
COMMUNITY

## 2023-05-20 RX ORDER — ATORVASTATIN CALCIUM 10 MG/1
10 TABLET, FILM COATED ORAL DAILY
COMMUNITY

## 2023-05-20 RX ORDER — ALLOPURINOL 100 MG/1
100 TABLET ORAL DAILY
COMMUNITY

## 2023-09-28 ENCOUNTER — HOSPITAL ENCOUNTER (EMERGENCY)
Facility: HOSPITAL | Age: 68
Discharge: HOME | End: 2023-09-28
Attending: STUDENT IN AN ORGANIZED HEALTH CARE EDUCATION/TRAINING PROGRAM
Payer: MEDICARE

## 2023-09-28 VITALS
DIASTOLIC BLOOD PRESSURE: 81 MMHG | TEMPERATURE: 97.5 F | RESPIRATION RATE: 17 BRPM | HEART RATE: 60 BPM | OXYGEN SATURATION: 98 % | SYSTOLIC BLOOD PRESSURE: 169 MMHG

## 2023-09-28 DIAGNOSIS — S39.012A STRAIN OF LUMBAR REGION, INITIAL ENCOUNTER: Primary | ICD-10-CM

## 2023-09-28 PROCEDURE — 99284 EMERGENCY DEPT VISIT MOD MDM: CPT

## 2023-09-28 PROCEDURE — 6370000000 HC RX 637 (ALT 250 FOR IP): Performed by: STUDENT IN AN ORGANIZED HEALTH CARE EDUCATION/TRAINING PROGRAM

## 2023-09-28 PROCEDURE — 6360000002 HC RX W HCPCS: Performed by: STUDENT IN AN ORGANIZED HEALTH CARE EDUCATION/TRAINING PROGRAM

## 2023-09-28 RX ORDER — LIDOCAINE 4 G/G
1 PATCH TOPICAL DAILY
Qty: 10 PATCH | Refills: 0 | Status: SHIPPED | OUTPATIENT
Start: 2023-09-28 | End: 2023-10-28

## 2023-09-28 RX ORDER — METHOCARBAMOL 750 MG/1
1500 TABLET, FILM COATED ORAL ONCE
Status: COMPLETED | OUTPATIENT
Start: 2023-09-28 | End: 2023-09-28

## 2023-09-28 RX ORDER — KETOROLAC TROMETHAMINE 15 MG/ML
15 INJECTION, SOLUTION INTRAMUSCULAR; INTRAVENOUS
Status: COMPLETED | OUTPATIENT
Start: 2023-09-28 | End: 2023-09-28

## 2023-09-28 RX ORDER — METHOCARBAMOL 750 MG/1
1500 TABLET, FILM COATED ORAL 3 TIMES DAILY
Qty: 18 TABLET | Refills: 0 | Status: SHIPPED | OUTPATIENT
Start: 2023-09-28 | End: 2023-10-01

## 2023-09-28 RX ADMIN — KETOROLAC TROMETHAMINE 15 MG: 15 INJECTION, SOLUTION INTRAMUSCULAR; INTRAVENOUS at 15:02

## 2023-09-28 RX ADMIN — METHOCARBAMOL 1500 MG: 750 TABLET ORAL at 15:02

## 2023-09-28 ASSESSMENT — PAIN - FUNCTIONAL ASSESSMENT: PAIN_FUNCTIONAL_ASSESSMENT: 0-10

## 2023-09-28 ASSESSMENT — PAIN SCALES - GENERAL: PAINLEVEL_OUTOF10: 6

## 2023-09-28 NOTE — ED TRIAGE NOTES
Pt reports lower back pain x 4 days after lifting a heavy object. Pt ambulatory to triage with no difficulty.

## 2023-09-28 NOTE — ED NOTES
Pt given discharge and follow up instructions. Pt advised to follow up with PCP. Education on pain management given.       ZAID Dukes  09/28/23 0349

## 2023-09-29 NOTE — ED PROVIDER NOTES
Inhale 2 puffs into the lungs 2 times daily      chlorthalidone (HYGROTON) 25 MG tablet Take 1 tablet by mouth daily      furosemide (LASIX) 20 MG tablet Take 1 tablet by mouth daily      gabapentin (NEURONTIN) 300 MG capsule Take 1 capsule by mouth 3 times daily. Max Daily Amount: 900 mg      hydrALAZINE (APRESOLINE) 50 MG tablet Take 1 tablet by mouth 3 times daily      isosorbide mononitrate (IMDUR) 30 MG extended release tablet Take 1 tablet by mouth daily      lisinopril (PRINIVIL;ZESTRIL) 20 MG tablet Take 1 tablet by mouth daily      metFORMIN (GLUCOPHAGE) 500 MG tablet Take 1 tablet by mouth 2 times daily (with meals)      metoprolol tartrate (LOPRESSOR) 50 MG tablet Take 1 tablet by mouth 2 times daily      omeprazole (PRILOSEC OTC) 20 MG tablet Take 1 tablet by mouth daily      spironolactone (ALDACTONE) 25 MG tablet Take 1 tablet by mouth daily         Social Determinants of Health:   Social Determinants of Health     Tobacco Use: Medium Risk (12/2/2022)    Patient History     Smoking Tobacco Use: Former     Smokeless Tobacco Use: Never     Passive Exposure: Not on file   Alcohol Use: Not on file   Financial Resource Strain: Not on file   Food Insecurity: Not on file   Transportation Needs: Not on file   Physical Activity: Not on file   Stress: Not on file   Social Connections: Not on file   Intimate Partner Violence: Not on file   Depression: Not on file   Housing Stability: Not on file       PHYSICAL EXAM   Physical Exam  Constitutional:       Appearance: Normal appearance. HENT:      Head: Normocephalic and atraumatic. Right Ear: External ear normal.      Left Ear: External ear normal.      Nose: Nose normal. No rhinorrhea. Mouth/Throat:      Mouth: Mucous membranes are dry. Pharynx: Oropharynx is clear. Eyes:      Extraocular Movements: Extraocular movements intact. Conjunctiva/sclera: Conjunctivae normal.   Cardiovascular:      Rate and Rhythm: Normal rate and regular rhythm.